# Patient Record
Sex: MALE | Race: WHITE | NOT HISPANIC OR LATINO | URBAN - METROPOLITAN AREA
[De-identification: names, ages, dates, MRNs, and addresses within clinical notes are randomized per-mention and may not be internally consistent; named-entity substitution may affect disease eponyms.]

---

## 2023-01-07 ENCOUNTER — HOSPITAL ENCOUNTER (OUTPATIENT)
Dept: TELEMEDICINE | Facility: HOSPITAL | Age: 63
Discharge: HOME | End: 2023-01-07
Payer: COMMERCIAL

## 2023-01-07 DIAGNOSIS — G45.9 TIA (TRANSIENT ISCHEMIC ATTACK): ICD-10-CM

## 2023-01-07 PROCEDURE — 99205 PR OFFICE/OUTPT VISIT, NEW, LEVL V, 60-74 MIN: ICD-10-PCS | Mod: GT,,, | Performed by: PSYCHIATRY & NEUROLOGY

## 2023-01-07 PROCEDURE — 99205 OFFICE O/P NEW HI 60 MIN: CPT | Mod: GT,,, | Performed by: PSYCHIATRY & NEUROLOGY

## 2023-01-08 NOTE — CONSULTS
[facility]  Vascular Neurology  Comprehensive Stroke Center  TeleVascular Neurology Acute Consultation Note      Consults    Consulting Provider: TANVI REYNOLDS  Current Providers  No providers found    Patient Location: [facility] Emergency Department  Spoke hospital nurse at bedside with patient assisting consultant.     Patient information was obtained from patient and relative(s).         Assessment/Plan:       Diagnoses:   TIA (transient ischemic attack)  Transient right sided weakness. Back to baseline  Antithrombotics for secondary stroke prevention: Antiplatelets: Aspirin: 81 mg daily  Clopidogrel: 75 mg daily    Statins for secondary stroke prevention and hyperlipidemia, if present:   Statins: Atorvastatin- 80 mg daily    Aggressive risk factor modification: HTN, DM     Rehab efforts: The patient has been evaluated by a stroke team provider and the therapy needs have been fully considered based off the presenting complaints and exam findings. The following therapy evaluations are needed: PT evaluate and treat, OT evaluate and treat, SLP evaluate and treat    Diagnostics ordered/pending: CTA Head to assess vasculature , CTA Neck/Arch to assess vasculature, HgbA1C to assess blood glucose levels, MRI head without contrast to assess brain parenchyma, TTE to assess cardiac function/status , TSH to assess thyroid function    VTE prophylaxis: Enoxaparin 40 mg SQ every 24 hours    BP parameters: TIA: SBP <220 until imaging confirmation of no infarct             STROKE DOCUMENTATION     Acute Stroke Times:   Acute Stroke Times   Last Known Normal Date: 01/07/23  Last Known Normal Time: 2055  Stroke Team Arrival Date: 01/07/23  Stroke Team Arrival Time: 2153  CT Interpretation Time: 2200    NIH Scale:  Interval: baseline  1a. Level of Consciousness: 0-->Alert, keenly responsive  1b. LOC Questions: 0-->Answers both  questions correctly  1c. LOC Commands: 0-->Performs both tasks correctly  2. Best Gaze: 0-->Normal  3. Visual: 0-->No visual loss  4. Facial Palsy: 0-->Normal symmetrical movements  5a. Motor Arm, Left: 0-->No drift, limb holds 90 (or 45) degrees for full 10 secs  5b. Motor Arm, Right: 0-->No drift, limb holds 90 (or 45) degrees for full 10 secs  6a. Motor Leg, Left: 1-->Drift, leg falls by the end of the 5-sec period but does not hit bed  6b. Motor Leg, Right: 0-->No drift, leg holds 30 degree position for full 5 secs  7. Limb Ataxia: 0-->Absent  8. Sensory: 0-->Normal, no sensory loss  9. Best Language: 0-->No aphasia, normal  10. Dysarthria: 0-->Normal  11. Extinction and Inattention (formerly Neglect): 0-->No abnormality  Total (NIH Stroke Scale): 1     Modified Paige Score: 0  [place] Coma Scale:15   ABCD2 Score:    [id number] Score:   HAS -BLED Score:   ICH Score:   Hunt & Mccloud Classification:       There were no vitals taken for this visit.  Eligible for thrombolytic therapy?: No  Thrombolytic therapy recomended: Thrombolytic therapy not recommended due to Patient back to neurological baseline  Possible Interventional Revascularization Candidate? No; at this time symptoms not suggestive of large vessel occlusion    Disposition Recommendation: admit to inpatient    Subjective:     History of Present Illness:  Sudden onset of generalized weakness and dizziness. Also noticed right sided weakness but improving    PMHx  HTN  DM        Woke up with symptoms?: no    Recent bleeding noted: no  Does the patient take any Blood Thinners? no  Medications: No relevant medications      Past Medical History: hypertension and diabetes    Past Surgical History: no relevant surgical history    Family History: no relevant history    Social History: no smoking, no drinking, no drugs    Allergies:  No known drug allergies    Review of Systems   Neurological: Positive for weakness.   All other systems reviewed and are  negative.    Objective:   Vitals:  BP: 178/88, Respiratory Rate: 16 and Heart Rate: 76    CT READ: Yes  No hemmorhage. No mass effect. No early infarct signs.     Physical Exam  Vitals reviewed.   Constitutional:       Appearance: He is obese.   HENT:      Head: Normocephalic and atraumatic.   Eyes:      Extraocular Movements: Extraocular movements intact.      Pupils: Pupils are equal, round, and reactive to light.   Pulmonary:      Effort: Pulmonary effort is normal.   Neurological:      General: No focal deficit present.      Mental Status: He is alert and oriented to person, place, and time.             Recommended the emergency room physician to have a brief discussion with the patient and/or family if available regarding the  risks and benefits of treatment, and to briefly document the occurrence of that discussion in his clinical encounter note.     The attending portion of this evaluation, treatment, and documentation was performed per Benedict Dyer MD via audiovisual.    Billing code:  ([place] [facility] INPT CONSULT 35MIN)    In your opinion, this was a: Tier 1 Van Negative    Consult End Time: 10:17 PM     Benedict Dyer MD  Comprehensive Stroke Center  Vascular Neurology   [facility]

## 2023-01-08 NOTE — HPI
Sudden onset of generalized weakness and dizziness. Also noticed right sided weakness but improving    PMHx  HTN  DM

## 2023-01-08 NOTE — SUBJECTIVE & OBJECTIVE
Woke up with symptoms?: no    Recent bleeding noted: no  Does the patient take any Blood Thinners? no  Medications: No relevant medications      Past Medical History: hypertension and diabetes    Past Surgical History: no relevant surgical history    Family History: no relevant history    Social History: no smoking, no drinking, no drugs    Allergies:  No known drug allergies    Review of Systems   Neurological: Positive for weakness.   All other systems reviewed and are negative.    Objective:   Vitals:  BP: 178/88, Respiratory Rate: 16 and Heart Rate: 76    CT READ: Yes  No hemmorhage. No mass effect. No early infarct signs.     Physical Exam  Vitals reviewed.   Constitutional:       Appearance: He is obese.   HENT:      Head: Normocephalic and atraumatic.   Eyes:      Extraocular Movements: Extraocular movements intact.      Pupils: Pupils are equal, round, and reactive to light.   Pulmonary:      Effort: Pulmonary effort is normal.   Neurological:      General: No focal deficit present.      Mental Status: He is alert and oriented to person, place, and time.

## 2023-01-08 NOTE — ASSESSMENT & PLAN NOTE
Transient right sided weakness. Back to baseline  Antithrombotics for secondary stroke prevention: Antiplatelets: Aspirin: 81 mg daily  Clopidogrel: 75 mg daily    Statins for secondary stroke prevention and hyperlipidemia, if present:   Statins: Atorvastatin- 80 mg daily    Aggressive risk factor modification: HTN, DM     Rehab efforts: The patient has been evaluated by a stroke team provider and the therapy needs have been fully considered based off the presenting complaints and exam findings. The following therapy evaluations are needed: PT evaluate and treat, OT evaluate and treat, SLP evaluate and treat    Diagnostics ordered/pending: CTA Head to assess vasculature , CTA Neck/Arch to assess vasculature, HgbA1C to assess blood glucose levels, MRI head without contrast to assess brain parenchyma, TTE to assess cardiac function/status , TSH to assess thyroid function    VTE prophylaxis: Enoxaparin 40 mg SQ every 24 hours    BP parameters: TIA: SBP <220 until imaging confirmation of no infarct

## 2023-04-10 PROBLEM — G45.9 TIA (TRANSIENT ISCHEMIC ATTACK): Status: RESOLVED | Noted: 2023-01-07 | Resolved: 2023-04-10
